# Patient Record
Sex: FEMALE | Race: WHITE | NOT HISPANIC OR LATINO | URBAN - METROPOLITAN AREA
[De-identification: names, ages, dates, MRNs, and addresses within clinical notes are randomized per-mention and may not be internally consistent; named-entity substitution may affect disease eponyms.]

---

## 2018-07-02 ENCOUNTER — LAB VISIT (OUTPATIENT)
Dept: LAB | Facility: HOSPITAL | Age: 73
End: 2018-07-02
Attending: INTERNAL MEDICINE
Payer: MEDICARE

## 2018-07-02 ENCOUNTER — OFFICE VISIT (OUTPATIENT)
Dept: RHEUMATOLOGY | Facility: CLINIC | Age: 73
End: 2018-07-02
Payer: MEDICARE

## 2018-07-02 VITALS
DIASTOLIC BLOOD PRESSURE: 74 MMHG | HEIGHT: 71 IN | SYSTOLIC BLOOD PRESSURE: 171 MMHG | HEART RATE: 65 BPM | BODY MASS INDEX: 29.68 KG/M2 | WEIGHT: 212 LBS

## 2018-07-02 DIAGNOSIS — I73.00 RAYNAUD'S PHENOMENON WITHOUT GANGRENE: ICD-10-CM

## 2018-07-02 DIAGNOSIS — I73.00 RAYNAUD'S PHENOMENON WITHOUT GANGRENE: Primary | ICD-10-CM

## 2018-07-02 LAB
ALBUMIN SERPL BCP-MCNC: 3.8 G/DL
ALP SERPL-CCNC: 103 U/L
ALT SERPL W/O P-5'-P-CCNC: 19 U/L
ANION GAP SERPL CALC-SCNC: 7 MMOL/L
AST SERPL-CCNC: 23 U/L
BILIRUB SERPL-MCNC: 1.8 MG/DL
BUN SERPL-MCNC: 14 MG/DL
CALCIUM SERPL-MCNC: 9.9 MG/DL
CHLORIDE SERPL-SCNC: 105 MMOL/L
CO2 SERPL-SCNC: 28 MMOL/L
CREAT SERPL-MCNC: 0.8 MG/DL
EST. GFR  (AFRICAN AMERICAN): >60 ML/MIN/1.73 M^2
EST. GFR  (NON AFRICAN AMERICAN): >60 ML/MIN/1.73 M^2
GLUCOSE SERPL-MCNC: 106 MG/DL
POTASSIUM SERPL-SCNC: 3.9 MMOL/L
PROT SERPL-MCNC: 6.9 G/DL
SODIUM SERPL-SCNC: 140 MMOL/L

## 2018-07-02 PROCEDURE — 84165 PROTEIN E-PHORESIS SERUM: CPT | Mod: 26,,, | Performed by: PATHOLOGY

## 2018-07-02 PROCEDURE — 84165 PROTEIN E-PHORESIS SERUM: CPT

## 2018-07-02 PROCEDURE — 86334 IMMUNOFIX E-PHORESIS SERUM: CPT

## 2018-07-02 PROCEDURE — 36415 COLL VENOUS BLD VENIPUNCTURE: CPT

## 2018-07-02 PROCEDURE — 86334 IMMUNOFIX E-PHORESIS SERUM: CPT | Mod: 26,,, | Performed by: PATHOLOGY

## 2018-07-02 PROCEDURE — 80053 COMPREHEN METABOLIC PANEL: CPT

## 2018-07-02 PROCEDURE — 99205 OFFICE O/P NEW HI 60 MIN: CPT | Mod: S$PBB,,, | Performed by: INTERNAL MEDICINE

## 2018-07-02 PROCEDURE — 99203 OFFICE O/P NEW LOW 30 MIN: CPT | Mod: PBBFAC | Performed by: INTERNAL MEDICINE

## 2018-07-02 PROCEDURE — 99999 PR PBB SHADOW E&M-NEW PATIENT-LVL III: CPT | Mod: PBBFAC,,, | Performed by: INTERNAL MEDICINE

## 2018-07-02 PROCEDURE — 82595 ASSAY OF CRYOGLOBULIN: CPT

## 2018-07-02 RX ORDER — ASPIRIN 81 MG/1
81 TABLET ORAL DAILY
COMMUNITY

## 2018-07-02 RX ORDER — BRIMONIDINE TARTRATE, TIMOLOL MALEATE 2; 5 MG/ML; MG/ML
1 SOLUTION/ DROPS OPHTHALMIC 2 TIMES DAILY
Refills: 6 | COMMUNITY
Start: 2018-04-26

## 2018-07-02 RX ORDER — METOPROLOL SUCCINATE 50 MG/1
50 TABLET, EXTENDED RELEASE ORAL DAILY
Refills: 11 | COMMUNITY
Start: 2018-05-25

## 2018-07-02 RX ORDER — BRIMONIDINE TARTRATE AND TIMOLOL MALEATE 2; 5 MG/ML; MG/ML
SOLUTION OPHTHALMIC
COMMUNITY

## 2018-07-02 RX ORDER — ATORVASTATIN CALCIUM 40 MG/1
40 TABLET, FILM COATED ORAL DAILY
COMMUNITY

## 2018-07-02 RX ORDER — LISINOPRIL 20 MG/1
20 TABLET ORAL DAILY
COMMUNITY

## 2018-07-02 RX ORDER — NITROGLYCERIN 20 MG/G
OINTMENT TOPICAL
Refills: 3 | COMMUNITY
Start: 2018-05-21

## 2018-07-02 ASSESSMENT — ROUTINE ASSESSMENT OF PATIENT INDEX DATA (RAPID3)
TOTAL RAPID3 SCORE: 3.33
MDHAQ FUNCTION SCORE: 0
FATIGUE SCORE: 0
PATIENT GLOBAL ASSESSMENT SCORE: 10
PAIN SCORE: 0

## 2018-07-02 NOTE — LETTER
July 3, 2018      Jason Mari MD  [address] [facility] [address]  [place] [address]           [facility]  [address]  [facility] [address]  Phone: [phone]  Fax: [phone]          Patient: Prudence Grewal   MR Number: [id number]   YOB: 1945   Date of Visit: 7/2/2018       Dear Dr. Jason Mari:    Thank you for referring Prudence Grewal to me for evaluation. Attached you will find relevant portions of my assessment and plan of care.    If you have questions, please do not hesitate to call me. I look forward to following Prudence Grewal along with you.    Sincerely,    Carrington Talley MD    Enclosure  CC:  No Recipients    If you would like to receive this communication electronically, please contact [email or web address] or [phone] to request more information on [employer] access.    For providers and/or their staff who would like to refer a patient to [facility], please contact us through our one-stop-shop provider referral line, [facility], at [phone].    If you feel you have received this communication in error or would no longer like to receive these types of communications, please e-mail [email or web address]

## 2018-07-03 LAB
ALBUMIN SERPL ELPH-MCNC: 3.78 G/DL
ALPHA1 GLOB SERPL ELPH-MCNC: 0.31 G/DL
ALPHA2 GLOB SERPL ELPH-MCNC: 0.72 G/DL
B-GLOBULIN SERPL ELPH-MCNC: 0.72 G/DL
GAMMA GLOB SERPL ELPH-MCNC: 0.98 G/DL
PATHOLOGIST INTERPRETATION SPE: NORMAL
PROT SERPL-MCNC: 6.5 G/DL

## 2018-07-03 NOTE — PROGRESS NOTES
History of present illness:  73-year-old female comes in because of color changes in her hands.  This started occurring 1 year ago.  She thinks she may have had 12 episodes over the past year.  She tries to related to the food she has consumed since apparently it does occur after meals.  It is not related to cold exposure.  The tips of her fingers turn blue.  It happens more on the right hand than on the left.  It does not happen on the feet, tip of the nose, or tip of the ears.  Sometimes the base of her finger turns white in addition to her fingertips turning blue.  They never turn red.  The episodes may last up to 15 min.  They are not painful although sometimes she has some numbness in the hands.  She was originally seen by Dr. Brock for evaluation of connective tissue disease.  She had negative immunologic studies, normal inflammatory markers, and a normal CBC.  She did have an EMG which showed carpal tunnel syndrome and a cervical radiculopathy.  She was also evaluated by her cardiologist but had not seen a vascular surgeon.  She does not smoke or use nicotine products.  She had no similar problem when she was younger.  She has had no digital ulcers.    No fever, headache, rash, conjunctivitis, oral ulcers, dry eyes or mouth, Raynaud's phenomenon, pleurisy, chronic or bloody diarrhea, vaginal or urethral discharge or ulcer, numbness or tingling, blood clots or phlebitis.  She has some pain in her knees with kneeling but no evidence of joint swelling.  She has 15 of morning stiffness.  She has no family history of connective tissue disease.    Systems review:  General:  Her weight has decreased 30 lb on a diet  GI:  No abdominal pain or peptic ulcer disease.  No liver problems.  :  No kidney or bladder problems    Physical examination:  Skin:  No rashes  ENT:  No conjunctival injection.  She has a small sore at the corner of her mouth.  She has no oral ulcers.  Adequate tears in saliva.  Chest:  Clear to  auscultation and percussion  Cardiac:  No murmurs, gallops, rubs  Abdomen:  No organomegaly or masses.  No tenderness to palpation  Extremities:  Peripheral pulses are 2+ and equal bilaterally.  She has no bruits.  She has negative Adson's maneuver.  She has no sclerodactyly.  She has no digital ulcers.  Musculoskeletal:  She has flexion deformity of the left 5th finger.  This is due to previous injury.  She has no synovitis.  She has full range of motion of all other joints.  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  Some a thorn he is considered these to be the same condition.  Acrocyanosis is less likely associated with an underlying connective tissue disease.  It is a benign condition that is usually not associated with digital ulcers.    Plans:  I have ordered further laboratory studies to make sure we are not dealing with abnormal proteins the could lead to hyperviscosity.  No further immunologic workup is needed.  I reassured her that this is a benign condition and although it looks bad it is not dangerous.  I did not give her regular return appointment but would be happy to see her in follow-up if necessary.

## 2018-07-05 LAB
INTERPRETATION SERPL IFE-IMP: NORMAL
PATHOLOGIST INTERPRETATION IFE: NORMAL

## 2018-07-12 LAB — CRYOGLOB SER QL: NORMAL
